# Patient Record
Sex: FEMALE | Race: WHITE | NOT HISPANIC OR LATINO | Employment: FULL TIME | URBAN - METROPOLITAN AREA
[De-identification: names, ages, dates, MRNs, and addresses within clinical notes are randomized per-mention and may not be internally consistent; named-entity substitution may affect disease eponyms.]

---

## 2017-04-24 PROBLEM — N92.0 HYPERMENORRHEA: Status: ACTIVE | Noted: 2017-04-24

## 2020-11-10 ENCOUNTER — HOSPITAL ENCOUNTER (OUTPATIENT)
Dept: RADIOLOGY | Facility: HOSPITAL | Age: 40
Discharge: HOME | End: 2020-11-10
Attending: INTERNAL MEDICINE
Payer: COMMERCIAL

## 2020-11-10 VITALS — WEIGHT: 156.94 LBS | BODY MASS INDEX: 26.79 KG/M2 | HEIGHT: 64 IN

## 2020-11-10 DIAGNOSIS — Z12.31 SCREENING MAMMOGRAM, ENCOUNTER FOR: ICD-10-CM

## 2020-11-10 PROCEDURE — 77063 MAMMO DIGITAL SCREENING BILAT WITH TOMO: ICD-10-PCS | Mod: 26,,, | Performed by: RADIOLOGY

## 2020-11-10 PROCEDURE — 77067 MAMMO DIGITAL SCREENING BILAT WITH TOMO: ICD-10-PCS | Mod: 26,,, | Performed by: RADIOLOGY

## 2020-11-10 PROCEDURE — 77067 SCR MAMMO BI INCL CAD: CPT | Mod: TC,PN

## 2020-11-10 PROCEDURE — 77067 SCR MAMMO BI INCL CAD: CPT | Mod: 26,,, | Performed by: RADIOLOGY

## 2020-11-10 PROCEDURE — 77063 BREAST TOMOSYNTHESIS BI: CPT | Mod: 26,,, | Performed by: RADIOLOGY

## 2021-07-22 RX ORDER — SPIRONOLACTONE 50 MG/1
50 TABLET, FILM COATED ORAL DAILY
Qty: 30 TABLET | Refills: 4 | Status: SHIPPED | OUTPATIENT
Start: 2021-07-22 | End: 2022-01-18

## 2021-11-16 ENCOUNTER — OFFICE VISIT (OUTPATIENT)
Dept: OBSTETRICS AND GYNECOLOGY | Facility: CLINIC | Age: 41
End: 2021-11-16
Payer: COMMERCIAL

## 2021-11-16 VITALS — BODY MASS INDEX: 26.98 KG/M2 | WEIGHT: 157.19 LBS

## 2021-11-16 DIAGNOSIS — Z01.419 ENCOUNTER FOR ANNUAL ROUTINE GYNECOLOGICAL EXAMINATION: Primary | ICD-10-CM

## 2021-11-16 PROCEDURE — 1160F PR REVIEW ALL MEDS BY PRESCRIBER/CLIN PHARMACIST DOCUMENTED: ICD-10-PCS | Mod: CPTII,S$GLB,, | Performed by: OBSTETRICS & GYNECOLOGY

## 2021-11-16 PROCEDURE — 88175 CYTOPATH C/V AUTO FLUID REDO: CPT | Performed by: OBSTETRICS & GYNECOLOGY

## 2021-11-16 PROCEDURE — 3008F BODY MASS INDEX DOCD: CPT | Mod: CPTII,S$GLB,, | Performed by: OBSTETRICS & GYNECOLOGY

## 2021-11-16 PROCEDURE — 1159F PR MEDICATION LIST DOCUMENTED IN MEDICAL RECORD: ICD-10-PCS | Mod: CPTII,S$GLB,, | Performed by: OBSTETRICS & GYNECOLOGY

## 2021-11-16 PROCEDURE — 99999 PR PBB SHADOW E&M-EST. PATIENT-LVL III: CPT | Mod: PBBFAC,,, | Performed by: OBSTETRICS & GYNECOLOGY

## 2021-11-16 PROCEDURE — 1160F RVW MEDS BY RX/DR IN RCRD: CPT | Mod: CPTII,S$GLB,, | Performed by: OBSTETRICS & GYNECOLOGY

## 2021-11-16 PROCEDURE — 99999 PR PBB SHADOW E&M-EST. PATIENT-LVL III: ICD-10-PCS | Mod: PBBFAC,,, | Performed by: OBSTETRICS & GYNECOLOGY

## 2021-11-16 PROCEDURE — 3008F PR BODY MASS INDEX (BMI) DOCUMENTED: ICD-10-PCS | Mod: CPTII,S$GLB,, | Performed by: OBSTETRICS & GYNECOLOGY

## 2021-11-16 PROCEDURE — 99396 PREV VISIT EST AGE 40-64: CPT | Mod: S$GLB,,, | Performed by: OBSTETRICS & GYNECOLOGY

## 2021-11-16 PROCEDURE — 99396 PR PREVENTIVE VISIT,EST,40-64: ICD-10-PCS | Mod: S$GLB,,, | Performed by: OBSTETRICS & GYNECOLOGY

## 2021-11-16 PROCEDURE — 1159F MED LIST DOCD IN RCRD: CPT | Mod: CPTII,S$GLB,, | Performed by: OBSTETRICS & GYNECOLOGY

## 2022-01-05 ENCOUNTER — PATIENT MESSAGE (OUTPATIENT)
Dept: ADMINISTRATIVE | Facility: OTHER | Age: 42
End: 2022-01-05
Payer: COMMERCIAL

## 2022-11-07 RX ORDER — SPIRONOLACTONE 50 MG/1
TABLET, FILM COATED ORAL
Qty: 30 TABLET | Refills: 0 | Status: SHIPPED | OUTPATIENT
Start: 2022-11-07 | End: 2023-01-19 | Stop reason: SDUPTHER

## 2022-12-12 RX ORDER — SPIRONOLACTONE 50 MG/1
TABLET, FILM COATED ORAL
Qty: 30 TABLET | Refills: 0 | OUTPATIENT
Start: 2022-12-12

## 2023-01-18 ENCOUNTER — OFFICE VISIT (OUTPATIENT)
Dept: OBSTETRICS AND GYNECOLOGY | Facility: CLINIC | Age: 43
End: 2023-01-18
Payer: COMMERCIAL

## 2023-01-18 VITALS
HEIGHT: 64 IN | SYSTOLIC BLOOD PRESSURE: 108 MMHG | DIASTOLIC BLOOD PRESSURE: 74 MMHG | BODY MASS INDEX: 28.79 KG/M2 | WEIGHT: 168.63 LBS

## 2023-01-18 DIAGNOSIS — Z01.419 ENCOUNTER FOR ANNUAL ROUTINE GYNECOLOGICAL EXAMINATION: ICD-10-CM

## 2023-01-18 DIAGNOSIS — Z12.4 SCREENING FOR MALIGNANT NEOPLASM OF CERVIX: Primary | ICD-10-CM

## 2023-01-18 PROCEDURE — 3008F PR BODY MASS INDEX (BMI) DOCUMENTED: ICD-10-PCS | Mod: CPTII,S$GLB,, | Performed by: OBSTETRICS & GYNECOLOGY

## 2023-01-18 PROCEDURE — 99999 PR PBB SHADOW E&M-EST. PATIENT-LVL IV: CPT | Mod: PBBFAC,,, | Performed by: OBSTETRICS & GYNECOLOGY

## 2023-01-18 PROCEDURE — 1160F PR REVIEW ALL MEDS BY PRESCRIBER/CLIN PHARMACIST DOCUMENTED: ICD-10-PCS | Mod: CPTII,S$GLB,, | Performed by: OBSTETRICS & GYNECOLOGY

## 2023-01-18 PROCEDURE — 99999 PR PBB SHADOW E&M-EST. PATIENT-LVL IV: ICD-10-PCS | Mod: PBBFAC,,, | Performed by: OBSTETRICS & GYNECOLOGY

## 2023-01-18 PROCEDURE — 99396 PR PREVENTIVE VISIT,EST,40-64: ICD-10-PCS | Mod: S$GLB,,, | Performed by: OBSTETRICS & GYNECOLOGY

## 2023-01-18 PROCEDURE — 99396 PREV VISIT EST AGE 40-64: CPT | Mod: S$GLB,,, | Performed by: OBSTETRICS & GYNECOLOGY

## 2023-01-18 PROCEDURE — 1159F PR MEDICATION LIST DOCUMENTED IN MEDICAL RECORD: ICD-10-PCS | Mod: CPTII,S$GLB,, | Performed by: OBSTETRICS & GYNECOLOGY

## 2023-01-18 PROCEDURE — 1160F RVW MEDS BY RX/DR IN RCRD: CPT | Mod: CPTII,S$GLB,, | Performed by: OBSTETRICS & GYNECOLOGY

## 2023-01-18 PROCEDURE — 3074F PR MOST RECENT SYSTOLIC BLOOD PRESSURE < 130 MM HG: ICD-10-PCS | Mod: CPTII,S$GLB,, | Performed by: OBSTETRICS & GYNECOLOGY

## 2023-01-18 PROCEDURE — 3078F DIAST BP <80 MM HG: CPT | Mod: CPTII,S$GLB,, | Performed by: OBSTETRICS & GYNECOLOGY

## 2023-01-18 PROCEDURE — 3008F BODY MASS INDEX DOCD: CPT | Mod: CPTII,S$GLB,, | Performed by: OBSTETRICS & GYNECOLOGY

## 2023-01-18 PROCEDURE — 3078F PR MOST RECENT DIASTOLIC BLOOD PRESSURE < 80 MM HG: ICD-10-PCS | Mod: CPTII,S$GLB,, | Performed by: OBSTETRICS & GYNECOLOGY

## 2023-01-18 PROCEDURE — 3074F SYST BP LT 130 MM HG: CPT | Mod: CPTII,S$GLB,, | Performed by: OBSTETRICS & GYNECOLOGY

## 2023-01-18 PROCEDURE — 88175 CYTOPATH C/V AUTO FLUID REDO: CPT | Performed by: OBSTETRICS & GYNECOLOGY

## 2023-01-18 PROCEDURE — 1159F MED LIST DOCD IN RCRD: CPT | Mod: CPTII,S$GLB,, | Performed by: OBSTETRICS & GYNECOLOGY

## 2023-01-18 RX ORDER — LEVOTHYROXINE SODIUM 88 UG/1
88 TABLET ORAL
COMMUNITY
Start: 2022-11-02

## 2023-01-18 NOTE — PROGRESS NOTES
Chief Complaint   Patient presents with    Well Woman       History and Physical:  No LMP recorded. Patient has had an ablation.       Tash Kwon is a 42 y.o.  WF who presents today for her routine annual GYN exam. The patient has no Gynecology complaints today. No menses      Allergies: Review of patient's allergies indicates:  No Known Allergies    Past Medical History:   Diagnosis Date    Acne     Anxiety     Breast disorder     H/O MTHFR mutation     Homozygous for MTHFR gene mutation     Hyperthyroidism     Pseudoangiomatous stromal hyperplasia of breast     Thyroid disease     Wears contact lenses        Past Surgical History:   Procedure Laterality Date    AUGMENTATION OF BREAST  2013    BREAST BIOPSY Left     BREAST SURGERY Bilateral     augmentation with implants    ENDOMETRIAL ABLATION      uterine ablation  2017       MEDS:   Current Outpatient Medications on File Prior to Visit   Medication Sig Dispense Refill    ASHWAGANDHA ROOT EXTRACT ORAL Take by mouth.      b complex vitamins capsule Take 1 capsule by mouth once daily.      DIINDOLYLMETHANE, BULK, MISC by Misc.(Non-Drug; Combo Route) route.       spironolactone (ALDACTONE) 50 MG tablet TAKE 1 TABLET(50 MG) BY MOUTH EVERY DAY 30 tablet 0    tretinoin (RETIN-A) 0.05 % cream Apply AS DIRECTED  0    VITAMED MD ONE RX 30 mg iron-1mg -200 mg Cap Take 1 capsule by mouth once daily.  6    vitamin D (VITAMIN D3) 1000 units Tab Take 1,000 Units by mouth once daily.      CMPD testosterone proprionate 2% in vanicream Apply 11 mg topically once daily. Apply 1 gram as directed 2-3 times weekly.      CYTOMEL 5 mcg Tab Take 5 mcg by mouth every morning.      diazePAM (VALIUM) 5 MG tablet Take 1 tablet (5 mg total) by mouth On call Procedure for Anxiety (30 minutes prior to MRI, may repeat x1). 2 tablet 0    estradioL (ESTRACE) 2 MG tablet TAKE 1 TABLET BY MOUTH EVERY NIGHT AT BEDTIME WITH CYCLE EVERY MONTH      LIDOcaine (LIDODERM) 5 % Place 1  patch onto the skin once daily. Remove & Discard patch within 12 hours or as directed by MD (Patient not taking: Reported on 2022) 12 patch 0    SYNTHROID 125 mcg tablet TAKE 1 TABLET BY MOUTH ONCE DAILY (Patient taking differently: 62.5 mcg. Take 1/2 dose) 90 tablet 3    SYNTHROID 88 mcg tablet Take 88 mcg by mouth.      TESTOSTERONE, BULK, MISC Inject as directed.       No current facility-administered medications on file prior to visit.       OB History          3    Para   3    Term   3            AB        Living   3         SAB        IAB        Ectopic        Multiple        Live Births                     Social History     Socioeconomic History    Marital status:    Tobacco Use    Smoking status: Never    Smokeless tobacco: Never   Substance and Sexual Activity    Alcohol use: Yes     Comment: occasional    Drug use: No    Sexual activity: Yes       Family History   Problem Relation Age of Onset    Brain cancer Paternal Grandfather     Breast cancer Paternal Grandmother 40        x2, 60 second dx    Cancer Paternal Grandmother         breast    No Known Problems Father     Rheum arthritis Mother     Other Mother         MTHFR    Breast cancer Maternal Aunt 60    Liver cancer Maternal Aunt     Cancer Maternal Aunt         breast    Leukemia Maternal Uncle     Leukemia Maternal Uncle     Ovarian cancer Neg Hx          Past medical and surgical history reviewed.   I have reviewed the patient's medical history in detail and updated the computerized patient record.        Review of System:   General: no chills, fever, night sweats, weight gain or weight loss  Psychological: no depression or suicidal ideation  Breasts: no new or changing breast lumps, nipple discharge or masses.  Respiratory: no cough, shortness of breath, or wheezing  Cardiovascular: no chest pain or dyspnea on exertion  Gastrointestinal: no abdominal pain, change in bowel habits, or black or bloody  "stools  Genito-Urinary: no incontinence, urinary frequency/urgency or vulvar/vaginal symptoms, pelvic pain or abnormal vaginal bleeding.  Musculoskeletal: no gait disturbance or muscular weakness      Physical Exam:   /74   Ht 5' 4" (1.626 m)   Wt 76.5 kg (168 lb 10.4 oz)   BMI 28.95 kg/m²   Constitutional: She appears alert and responsive. She appears well-developed, well-groomed, and well-nourished. No distress.   HENT:   Head: Normocephalic and atraumatic.   Eyes: Conjunctivae and EOM are normal. No scleral icterus.   Neck: Symmetrical. Normal range of motion. Neck supple. No tracheal deviation present. THYROID:  without masses or tenderness.  Cardiovascular: Normal rate, no rhythm abnormality noted. Extremities without swelling or edema, warm.    Pulmonary/Chest: Normal respiratory Effort. No distress or retractions. She exhibits no tenderness.  Breasts: are symmetrical.no masses, bilat aug   Right breast exhibits no inverted nipple, no mass, no nipple discharge, no skin change and no tenderness.   Left breast exhibits no inverted nipple, no mass, no nipple discharge, no skin change and no tenderness.  Abdominal: Soft. She exhibits no distension, hernias or masses. There is no tenderness. No enlargement of liver edge or spleen.  There is no rebound and no guarding.   Genitourinary:    External rectal exam shows no thrombosed external hemorrhoids, no lesions.     Pelvic exam was performed with patient supine.   No labial fusion, and symmetrical.    There is no rash, lesion or injury on the right labia.   There is no rash, lesion or injury on the left labia.   No bleeding and no signs of injury around the vaginal introitus, urethral meatus is normal size and without prolapse or lesions, urethra well supported. The cervix is visualized with no discharge, lesions or friability.   No vaginal discharge found.    No significant Cystocele, Enterocele or rectocele, and cervix and uterus well supported.   " Bimanual exam:   The urethra is normal to palpation and there are no palpable vaginal wall masses.   Uterus is not deviated, not enlarged, not fixed, normal shape and not tender.   Cervix exhibits no motion tenderness.    Right adnexum displays no mass or nodularity and no tenderness.   Left adnexum displays no mass or nodularity and no tenderness.  Musculoskeletal: Normal range of motion.   Lymphadenopathy: No inguinal adenopathy present.   Neurological: She is alert and oriented to person, place, and time. Coordination normal.   Skin: Skin is warm and dry. She is not diaphoretic. No rashes, lesions or ulcers.   Psychiatric: She has a normal mood and affect, oriented to person, place, and time.      Assessment:   Normal annual GYN exam  1. Screening for malignant neoplasm of cervix  Liquid-Based Pap Smear, Screening      2. Encounter for annual routine gynecological examination          Prev ablation    Plan:   PAP  Mammogram done  Follow up in 1 year.  Patient informed will be contacted with results within 2 weeks. Encouraged to please call back or email if she has not heard from us by then.

## 2023-12-07 DIAGNOSIS — Z91.89 AT HIGH RISK FOR BREAST CANCER: Primary | ICD-10-CM

## 2024-01-16 ENCOUNTER — OFFICE VISIT (OUTPATIENT)
Dept: OBSTETRICS AND GYNECOLOGY | Facility: CLINIC | Age: 44
End: 2024-01-16
Payer: COMMERCIAL

## 2024-01-16 VITALS
HEIGHT: 64 IN | SYSTOLIC BLOOD PRESSURE: 112 MMHG | WEIGHT: 169.31 LBS | DIASTOLIC BLOOD PRESSURE: 66 MMHG | BODY MASS INDEX: 28.91 KG/M2

## 2024-01-16 DIAGNOSIS — Z01.419 ENCOUNTER FOR ANNUAL ROUTINE GYNECOLOGICAL EXAMINATION: Primary | ICD-10-CM

## 2024-01-16 DIAGNOSIS — Z98.890 HISTORY OF ENDOMETRIAL ABLATION: ICD-10-CM

## 2024-01-16 PROCEDURE — 99396 PREV VISIT EST AGE 40-64: CPT | Mod: S$GLB,,, | Performed by: OBSTETRICS & GYNECOLOGY

## 2024-01-16 PROCEDURE — 88175 CYTOPATH C/V AUTO FLUID REDO: CPT | Performed by: OBSTETRICS & GYNECOLOGY

## 2024-01-16 PROCEDURE — 99999 PR PBB SHADOW E&M-EST. PATIENT-LVL IV: CPT | Mod: PBBFAC,,, | Performed by: OBSTETRICS & GYNECOLOGY

## 2024-01-16 RX ORDER — SPIRONOLACTONE 50 MG/1
50 TABLET, FILM COATED ORAL DAILY
Qty: 30 TABLET | Refills: 11 | Status: SHIPPED | OUTPATIENT
Start: 2024-01-16 | End: 2024-01-16

## 2024-01-16 RX ORDER — SPIRONOLACTONE 50 MG/1
50 TABLET, FILM COATED ORAL DAILY
Qty: 30 TABLET | Refills: 11 | Status: SHIPPED | OUTPATIENT
Start: 2024-01-16 | End: 2025-01-15

## 2024-01-16 NOTE — PROGRESS NOTES
Chief Complaint   Patient presents with    Well Woman       History and Physical:  No LMP recorded. Patient has had an ablation.       Tash Kwon is a 43 y.o.  WF who presents today for her routine annual GYN exam. The patient has no Gynecology complaints today. No mense, had ablation      Allergies: Review of patient's allergies indicates:  No Known Allergies    Past Medical History:   Diagnosis Date    Acne     Anxiety     Breast disorder     H/O MTHFR mutation     Homozygous for MTHFR gene mutation     Hyperthyroidism     Pseudoangiomatous stromal hyperplasia of breast     Thyroid disease     Wears contact lenses        Past Surgical History:   Procedure Laterality Date    AUGMENTATION OF BREAST  2013    BREAST BIOPSY Left     BREAST SURGERY Bilateral     augmentation with implants    ENDOMETRIAL ABLATION      uterine ablation  2017       MEDS:   Current Outpatient Medications on File Prior to Visit   Medication Sig Dispense Refill    ASHWAGANDHA ROOT EXTRACT ORAL Take by mouth.      b complex vitamins capsule Take 1 capsule by mouth once daily.      CMPD testosterone proprionate 2% in vanicream Apply 11 mg topically once daily. Apply 1 gram as directed 2-3 times weekly.      [START ON 2024] diazePAM (VALIUM) 5 MG tablet Take 1 tablet (5 mg total) by mouth On call Procedure for Anxiety (30 minutes prior to MRI, may repeat x1). 2 tablet 0    DIINDOLYLMETHANE, BULK, MISC by Misc.(Non-Drug; Combo Route) route.       metroNIDAZOLE (METROGEL) 0.75 % (37.5mg/5 gram) vaginal gel Place 1 applicator vaginally every evening. for 5 doses 14 applicator 0    SYNTHROID 125 mcg tablet TAKE 1 TABLET BY MOUTH ONCE DAILY (Patient taking differently: 62.5 mcg. Take 1/2 dose) 90 tablet 3    SYNTHROID 88 mcg tablet Take 88 mcg by mouth.      tretinoin (RETIN-A) 0.05 % cream Apply AS DIRECTED  0    BASIL JHA ONE RX 30 mg iron-1mg -200 mg Cap Take 1 capsule by mouth once daily.  6    vitamin D (VITAMIN D3) 1000  units Tab Take 1,000 Units by mouth once daily.      [DISCONTINUED] spironolactone (ALDACTONE) 50 MG tablet Take 1 tablet (50 mg total) by mouth once daily. 30 tablet 11     No current facility-administered medications on file prior to visit.       OB History          3    Para   3    Term   3            AB        Living   3         SAB        IAB        Ectopic        Multiple        Live Births                     Social History     Socioeconomic History    Marital status:    Tobacco Use    Smoking status: Never    Smokeless tobacco: Never   Substance and Sexual Activity    Alcohol use: Yes     Comment: occasional    Drug use: No    Sexual activity: Yes       Family History   Problem Relation Age of Onset    Brain cancer Paternal Grandfather     Breast cancer Paternal Grandmother 40        x2, 60 second dx    Cancer Paternal Grandmother         breast    No Known Problems Father     Rheum arthritis Mother     Other Mother         MTHFR    Breast cancer Maternal Aunt 60    Liver cancer Maternal Aunt     Cancer Maternal Aunt         breast    Leukemia Maternal Uncle     Leukemia Maternal Uncle     Ovarian cancer Neg Hx          Past medical and surgical history reviewed.   I have reviewed the patient's medical history in detail and updated the computerized patient record.        Review of System:   General: no chills, fever, night sweats, weight gain or weight loss  Psychological: no depression or suicidal ideation  Breasts: no new or changing breast lumps, nipple discharge or masses.  Respiratory: no cough, shortness of breath, or wheezing  Cardiovascular: no chest pain or dyspnea on exertion  Gastrointestinal: no abdominal pain, change in bowel habits, or black or bloody stools  Genito-Urinary: no incontinence, urinary frequency/urgency or vulvar/vaginal symptoms, pelvic pain or abnormal vaginal bleeding.  Musculoskeletal: no gait disturbance or muscular weakness      Physical Exam:   BP  "112/66   Ht 5' 4" (1.626 m)   Wt 76.8 kg (169 lb 5 oz)   BMI 29.06 kg/m²   Constitutional: She appears alert and responsive. She appears well-developed, well-groomed, and well-nourished. No distress.   HENT:   Head: Normocephalic and atraumatic.   Eyes: Conjunctivae and EOM are normal. No scleral icterus.   Neck: Symmetrical. Normal range of motion. Neck supple. No tracheal deviation present. THYROID:  without masses or tenderness.  Cardiovascular: Normal rate, no rhythm abnormality noted. Extremities without swelling or edema, warm.    Pulmonary/Chest: Normal respiratory Effort. No distress or retractions. She exhibits no tenderness.  Breasts: are symmetrical.no masses, bilat aug   Right breast exhibits no inverted nipple, no mass, no nipple discharge, no skin change and no tenderness.   Left breast exhibits no inverted nipple, no mass, no nipple discharge, no skin change and no tenderness.  Abdominal: Soft. She exhibits no distension, hernias or masses. There is no tenderness. No enlargement of liver edge or spleen.  There is no rebound and no guarding.   Genitourinary:    External rectal exam shows no thrombosed external hemorrhoids, no lesions.     Pelvic exam was performed with patient supine.   No labial fusion, and symmetrical.    There is no rash, lesion or injury on the right labia.   There is no rash, lesion or injury on the left labia.   No bleeding and no signs of injury around the vaginal introitus, urethral meatus is normal size and without prolapse or lesions, urethra well supported. The cervix is visualized with no discharge, lesions or friability.   No vaginal discharge found.    No significant Cystocele, Enterocele or rectocele, and cervix and uterus well supported.   Bimanual exam:   The urethra is normal to palpation and there are no palpable vaginal wall masses.   Uterus is not deviated, not enlarged, not fixed, normal shape and not tender.   Cervix exhibits no motion tenderness.    Right " adnexum displays no mass or nodularity and no tenderness.   Left adnexum displays no mass or nodularity and no tenderness.  Musculoskeletal: Normal range of motion.   Lymphadenopathy: No inguinal adenopathy present.   Neurological: She is alert and oriented to person, place, and time. Coordination normal.   Skin: Skin is warm and dry. She is not diaphoretic. No rashes, lesions or ulcers.   Psychiatric: She has a normal mood and affect, oriented to person, place, and time.      Assessment:   Normal annual GYN exam  1. Encounter for annual routine gynecological examination  Liquid-Based Pap Smear, Screening      2. History of endometrial ablation              Plan:   PAP  Mammogram done  Follow up in 1 year.  Patient informed will be contacted with results within 2 weeks. Encouraged to please call back or email if she has not heard from us by then.

## 2024-01-22 LAB
CLINICAL INFO: NORMAL
CYTO CVX: NORMAL
CYTOLOGIST CVX/VAG CYTO: NORMAL
CYTOLOGIST CVX/VAG CYTO: NORMAL
CYTOLOGY CMNT CVX/VAG CYTO-IMP: NORMAL
CYTOLOGY PAP THIN PREP EXPLANATION: NORMAL
DATE OF PREVIOUS PAP: NO
DATE PREVIOUS BX: NO
GEN CATEG CVX/VAG CYTO-IMP: NORMAL
LMP START DATE: NORMAL
MICROORGANISM CVX/VAG CYTO: NORMAL
PATHOLOGIST CVX/VAG CYTO: NORMAL
SERVICE CMNT-IMP: NORMAL
SPECIMEN SOURCE CVX/VAG CYTO: NORMAL
STAT OF ADQ CVX/VAG CYTO-IMP: NORMAL

## 2024-02-27 ENCOUNTER — OFFICE VISIT (OUTPATIENT)
Dept: OBSTETRICS AND GYNECOLOGY | Facility: CLINIC | Age: 44
End: 2024-02-27
Payer: COMMERCIAL

## 2024-02-27 VITALS
BODY MASS INDEX: 28.12 KG/M2 | DIASTOLIC BLOOD PRESSURE: 62 MMHG | SYSTOLIC BLOOD PRESSURE: 114 MMHG | WEIGHT: 163.81 LBS

## 2024-02-27 DIAGNOSIS — R87.615 UNSATISFACTORY CERVICAL PAPANICOLAOU SMEAR: Primary | ICD-10-CM

## 2024-02-27 PROCEDURE — 99499 UNLISTED E&M SERVICE: CPT | Mod: S$GLB,,, | Performed by: OBSTETRICS & GYNECOLOGY

## 2024-02-27 PROCEDURE — 88175 CYTOPATH C/V AUTO FLUID REDO: CPT | Performed by: OBSTETRICS & GYNECOLOGY

## 2024-02-27 PROCEDURE — 99999 PR PBB SHADOW E&M-EST. PATIENT-LVL III: CPT | Mod: PBBFAC,,, | Performed by: OBSTETRICS & GYNECOLOGY

## 2024-02-27 NOTE — PROGRESS NOTES
"Chief Complaint   Patient presents with    Gynecologic Exam     Repeat pap       History of Present Illness   43 y.o.    patient presents today for need pap, was "unsatisf."    Past medical and surgical history reviewed.   I have reviewed the patient's medical history in detail and updated the computerized patient record.    Review of patient's allergies indicates:  No Known Allergies            Physical Examination:  /62   Wt 74.3 kg (163 lb 12.8 oz)   LMP  (LMP Unknown)   BMI 28.12 kg/m²    Cx: no lesions Pap      Assessment:    1. Unsatisfactory cervical Papanicolaou smear  Liquid-Based Pap Smear, Screening          Plan:  Pap  Annual if normal  Patient informed will be contacted with results within 2 weeks. Encouraged to please call back or email if she has not heard from us by then.          "

## 2024-03-01 LAB
FINAL PATHOLOGIC DIAGNOSIS: NORMAL
Lab: NORMAL

## 2025-01-26 RX ORDER — SPIRONOLACTONE 50 MG/1
50 TABLET, FILM COATED ORAL
Qty: 90 TABLET | Refills: 0 | Status: SHIPPED | OUTPATIENT
Start: 2025-01-26

## 2025-01-26 NOTE — TELEPHONE ENCOUNTER
Refill Decision Note   Tash Kwon  is requesting a refill authorization.  Brief Assessment and Rationale for Refill:  Approve     Medication Therapy Plan:       Medication Reconciliation Completed: No   Comments:     No Care Gaps recommended.     Note composed:12:16 PM 01/26/2025

## 2025-03-24 ENCOUNTER — OFFICE VISIT (OUTPATIENT)
Dept: OBSTETRICS AND GYNECOLOGY | Facility: CLINIC | Age: 45
End: 2025-03-24
Payer: COMMERCIAL

## 2025-03-24 VITALS
WEIGHT: 168.19 LBS | DIASTOLIC BLOOD PRESSURE: 76 MMHG | SYSTOLIC BLOOD PRESSURE: 122 MMHG | BODY MASS INDEX: 28.87 KG/M2

## 2025-03-24 DIAGNOSIS — Z01.419 ENCOUNTER FOR ANNUAL ROUTINE GYNECOLOGICAL EXAMINATION: Primary | ICD-10-CM

## 2025-03-24 DIAGNOSIS — N99.85 POST ENDOMETRIAL ABLATION SYNDROME: ICD-10-CM

## 2025-03-24 DIAGNOSIS — N94.6 MENSTRUAL CRAMPS: ICD-10-CM

## 2025-03-24 DIAGNOSIS — Z98.890 HISTORY OF ENDOMETRIAL ABLATION: ICD-10-CM

## 2025-03-24 DIAGNOSIS — R39.9 UTI SYMPTOMS: ICD-10-CM

## 2025-03-24 LAB
BILIRUBIN, UA POC OHS: NEGATIVE
BLOOD, UA POC OHS: ABNORMAL
CLARITY, UA POC OHS: CLEAR
COLOR, UA POC OHS: YELLOW
GLUCOSE, UA POC OHS: NEGATIVE
KETONES, UA POC OHS: NEGATIVE
LEUKOCYTES, UA POC OHS: ABNORMAL
NITRITE, UA POC OHS: NEGATIVE
PH, UA POC OHS: 6.5
PROTEIN, UA POC OHS: NEGATIVE
SPECIFIC GRAVITY, UA POC OHS: 1.01
UROBILINOGEN, UA POC OHS: 0.2

## 2025-03-24 PROCEDURE — 87086 URINE CULTURE/COLONY COUNT: CPT | Performed by: OBSTETRICS & GYNECOLOGY

## 2025-03-24 PROCEDURE — 99396 PREV VISIT EST AGE 40-64: CPT | Mod: S$GLB,,, | Performed by: OBSTETRICS & GYNECOLOGY

## 2025-03-24 PROCEDURE — 3008F BODY MASS INDEX DOCD: CPT | Mod: CPTII,S$GLB,, | Performed by: OBSTETRICS & GYNECOLOGY

## 2025-03-24 PROCEDURE — 1159F MED LIST DOCD IN RCRD: CPT | Mod: CPTII,S$GLB,, | Performed by: OBSTETRICS & GYNECOLOGY

## 2025-03-24 PROCEDURE — 1160F RVW MEDS BY RX/DR IN RCRD: CPT | Mod: CPTII,S$GLB,, | Performed by: OBSTETRICS & GYNECOLOGY

## 2025-03-24 PROCEDURE — 3074F SYST BP LT 130 MM HG: CPT | Mod: CPTII,S$GLB,, | Performed by: OBSTETRICS & GYNECOLOGY

## 2025-03-24 PROCEDURE — 3078F DIAST BP <80 MM HG: CPT | Mod: CPTII,S$GLB,, | Performed by: OBSTETRICS & GYNECOLOGY

## 2025-03-24 PROCEDURE — 88175 CYTOPATH C/V AUTO FLUID REDO: CPT | Mod: TC | Performed by: OBSTETRICS & GYNECOLOGY

## 2025-03-24 PROCEDURE — 99999 PR PBB SHADOW E&M-EST. PATIENT-LVL IV: CPT | Mod: PBBFAC,,, | Performed by: OBSTETRICS & GYNECOLOGY

## 2025-03-24 PROCEDURE — 81003 URINALYSIS AUTO W/O SCOPE: CPT | Mod: QW,S$GLB,, | Performed by: OBSTETRICS & GYNECOLOGY

## 2025-03-24 RX ORDER — NYSTATIN AND TRIAMCINOLONE ACETONIDE 100000; 1 [USP'U]/G; MG/G
CREAM TOPICAL
Qty: 30 G | Refills: 1 | Status: SHIPPED | OUTPATIENT
Start: 2025-03-24 | End: 2026-03-24

## 2025-03-24 RX ORDER — PROGESTERONE 100 MG/1
100 CAPSULE ORAL NIGHTLY
COMMUNITY
Start: 2025-02-21

## 2025-03-24 RX ORDER — TERBINAFINE HYDROCHLORIDE 250 MG/1
250 TABLET ORAL
COMMUNITY
Start: 2025-03-17

## 2025-03-24 RX ORDER — FLUOXETINE HYDROCHLORIDE 20 MG/1
20 CAPSULE ORAL DAILY
Qty: 30 CAPSULE | Refills: 2 | Status: SHIPPED | OUTPATIENT
Start: 2025-03-24 | End: 2026-03-24

## 2025-03-24 NOTE — PROGRESS NOTES
"Chief Complaint   Patient presents with    Annual Exam    Cramping     Patient states that she is experiencing " period like cramps " for 1 -2 months.        History and Physical:  No LMP recorded. Patient has had an ablation.       Tash Kwon is a 44 y.o.  WF who presents today for her routine annual GYN exam. The patient has no Gynecology complaints today. Crampy pain, anxiety      Allergies: Review of patient's allergies indicates:  No Known Allergies    Past Medical History:   Diagnosis Date    Acne     Anxiety     Breast disorder     H/O MTHFR mutation     Homozygous for MTHFR gene mutation     Hyperthyroidism     Pseudoangiomatous stromal hyperplasia of breast     Thyroid disease     Wears contact lenses        Past Surgical History:   Procedure Laterality Date    AUGMENTATION OF BREAST  2013    BREAST BIOPSY Left     (PAS)    BREAST SURGERY Bilateral     augmentation with implants    ENDOMETRIAL ABLATION      uterine ablation  2017       MEDS: Medications Ordered Prior to Encounter[1]    OB History          3    Para   3    Term   3            AB        Living   3         SAB        IAB        Ectopic        Multiple        Live Births                     Social History[2]    Family History   Problem Relation Name Age of Onset    Brain cancer Paternal Grandfather      Breast cancer Paternal Grandmother  40        x2, 60 second dx    Cancer Paternal Grandmother          breast    No Known Problems Father      Rheum arthritis Mother      Breast cancer Maternal Aunt  60    Liver cancer Maternal Aunt      Cancer Maternal Aunt          breast    Leukemia Maternal Uncle      Leukemia Maternal Uncle      Ovarian cancer Neg Hx      Stroke Neg Hx      Uterine cancer Neg Hx      Colon cancer Neg Hx           Past medical and surgical history reviewed.   I have reviewed the patient's medical history in detail and updated the computerized patient record.        Review of System: "   General: no chills, fever, night sweats, weight gain or weight loss  Psychological: no depression or suicidal ideation  Breasts: no new or changing breast lumps, nipple discharge or masses.  Respiratory: no cough, shortness of breath, or wheezing  Cardiovascular: no chest pain or dyspnea on exertion  Gastrointestinal: no abdominal pain, change in bowel habits, or black or bloody stools  Genito-Urinary: no incontinence, urinary frequency/urgency or vulvar/vaginal symptoms, pelvic pain or abnormal vaginal bleeding.  Musculoskeletal: no gait disturbance or muscular weakness      Physical Exam:   /76 (Patient Position: Sitting)   Wt 76.3 kg (168 lb 3.4 oz)   BMI 28.87 kg/m²   Constitutional: She appears alert and responsive. She appears well-developed, well-groomed, and well-nourished. No distress.   HENT:   Head: Normocephalic and atraumatic.   Eyes: Conjunctivae and EOM are normal. No scleral icterus.   Neck: Symmetrical. Normal range of motion. Neck supple. No tracheal deviation present. THYROID:  without masses or tenderness.  Cardiovascular: Normal rate, no rhythm abnormality noted. Extremities without swelling or edema, warm.    Pulmonary/Chest: Normal respiratory Effort. No distress or retractions. She exhibits no tenderness.  Breasts: are symmetrical.bilat implants   Right breast exhibits no inverted nipple, no mass, no nipple discharge, no skin change and no tenderness.   Left breast exhibits no inverted nipple, no mass, no nipple discharge, no skin change and no tenderness.  Abdominal: Soft. She exhibits no distension, hernias or masses. There is no tenderness. No enlargement of liver edge or spleen.  There is no rebound and no guarding.   Genitourinary:    External rectal exam shows no thrombosed external hemorrhoids, no lesions.     Pelvic exam was performed with patient supine.   No labial fusion, and symmetrical.    There is no rash, lesion or injury on the right labia.   There is no rash, lesion  or injury on the left labia.   No bleeding and no signs of injury around the vaginal introitus, urethral meatus is normal size and without prolapse or lesions, urethra well supported. The cervix is visualized with no discharge, lesions or friability.   No vaginal discharge found.    No significant Cystocele, Enterocele or rectocele, and cervix and uterus well supported.   Bimanual exam:   The urethra is normal to palpation and there are no palpable vaginal wall masses.   Uterus is not deviated, not enlarged, not fixed, normal shape and not tender.   Cervix exhibits no motion tenderness.    Right adnexum displays no mass or nodularity and no tenderness.   Left adnexum displays no mass or nodularity and no tenderness.  Musculoskeletal: Normal range of motion.   Lymphadenopathy: No inguinal adenopathy present.   Neurological: She is alert and oriented to person, place, and time. Coordination normal.   Skin: Skin is warm and dry. She is not diaphoretic. No rashes, lesions or ulcers.   Psychiatric: She has a normal mood and affect, oriented to person, place, and time.      Assessment:   Normal annual GYN exam  1. Encounter for annual routine gynecological examination        2. History of endometrial ablation        3. Menstrual cramps  Liquid-Based Pap Smear, Screening    POCT Urinalysis(Instrument)    US Pelvis Comp with Transvag NON-OB (xpd      4. Post endometrial ablation syndrome  Liquid-Based Pap Smear, Screening    POCT Urinalysis(Instrument)    US Pelvis Comp with Transvag NON-OB (xpd        anxiety    Plan:   PAP  Mammogram done  Trial Prozac 20 mg  Mycolog  GI eval hypochol.  Follow up in 2 weeks sono and ov.  Patient informed will be contacted with results within 2 weeks. Encouraged to please call back or email if she has not heard from us by then.         [1]   Current Outpatient Medications on File Prior to Visit   Medication Sig Dispense Refill    DIINDOLYLMETHANE, BULK, MISC by Misc.(Non-Drug; Combo Route)  route.       progesterone (PROMETRIUM) 100 MG capsule Take 100 mg by mouth every evening.      propranoloL (INDERAL) 10 MG tablet Take 10 mg by mouth.      spironolactone (ALDACTONE) 50 MG tablet TAKE 1 TABLET BY MOUTH EVERY DAY 90 tablet 0    SYNTHROID 88 mcg tablet Take 88 mcg by mouth.      terbinafine HCL (LAMISIL) 250 mg tablet Take 250 mg by mouth.      vitamin D (VITAMIN D3) 1000 units Tab Take 1,000 Units by mouth once daily.      vitamin E 1000 UNIT capsule Take 1,000 Units by mouth once daily.      [START ON 6/23/2025] diazePAM (VALIUM) 5 MG tablet Take 1 tablet (5 mg total) by mouth once. for 1 dose (Patient not taking: Reported on 3/24/2025) 2 tablet 0     No current facility-administered medications on file prior to visit.   [2]   Social History  Socioeconomic History    Marital status:    Tobacco Use    Smoking status: Never    Smokeless tobacco: Never   Substance and Sexual Activity    Alcohol use: Yes     Comment: occasional    Drug use: No    Sexual activity: Yes     Partners: Male

## 2025-03-26 ENCOUNTER — RESULTS FOLLOW-UP (OUTPATIENT)
Dept: OBSTETRICS AND GYNECOLOGY | Facility: CLINIC | Age: 45
End: 2025-03-26

## 2025-03-26 LAB — BACTERIA UR CULT: NO GROWTH

## 2025-03-29 LAB
INSULIN SERPL-ACNC: NORMAL U[IU]/ML
LAB AP BETHESDA CATEGORY: NORMAL
LAB AP CLINICAL FINDINGS: NORMAL
LAB AP CONTRACEPTIVES: NORMAL
LAB AP GYN ADDITIONAL FINDINGS: NORMAL
LAB AP LMP DATE: NORMAL
LAB AP OCHS PAP SPECIMEN ADEQUACY: NORMAL
LAB AP OHS PAP INTERPRETATION: NORMAL
LAB AP PAP DISCLAIMER COMMENTS: NORMAL
LAB AP PAP ESTROGEN REPLACEMENT THERAPY: NORMAL
LAB AP PAP PMP: NORMAL
LAB AP PAP PREVIOUS BX: NORMAL
LAB AP PAP PRIOR TREATMENT: NORMAL
LAB AP PERFORMING LOCATION(S): NORMAL

## 2025-04-08 ENCOUNTER — OFFICE VISIT (OUTPATIENT)
Dept: OBSTETRICS AND GYNECOLOGY | Facility: CLINIC | Age: 45
End: 2025-04-08
Payer: COMMERCIAL

## 2025-04-08 ENCOUNTER — HOSPITAL ENCOUNTER (OUTPATIENT)
Dept: RADIOLOGY | Facility: HOSPITAL | Age: 45
Discharge: HOME | End: 2025-04-08
Attending: OBSTETRICS & GYNECOLOGY
Payer: COMMERCIAL

## 2025-04-08 VITALS — DIASTOLIC BLOOD PRESSURE: 70 MMHG | SYSTOLIC BLOOD PRESSURE: 110 MMHG

## 2025-04-08 DIAGNOSIS — N94.6 MENSTRUAL CRAMPS: ICD-10-CM

## 2025-04-08 DIAGNOSIS — Z98.890 HISTORY OF ENDOMETRIAL ABLATION: Primary | ICD-10-CM

## 2025-04-08 DIAGNOSIS — N99.85 POST ENDOMETRIAL ABLATION SYNDROME: ICD-10-CM

## 2025-04-08 PROCEDURE — 76856 US EXAM PELVIC COMPLETE: CPT | Mod: 26,,, | Performed by: RADIOLOGY

## 2025-04-08 PROCEDURE — 99999 PR PBB SHADOW E&M-EST. PATIENT-LVL III: CPT | Mod: PBBFAC,,, | Performed by: OBSTETRICS & GYNECOLOGY

## 2025-04-08 PROCEDURE — 76830 TRANSVAGINAL US NON-OB: CPT | Mod: 26,,, | Performed by: RADIOLOGY

## 2025-04-08 PROCEDURE — 76830 TRANSVAGINAL US NON-OB: CPT | Mod: TC,PN

## 2025-04-08 NOTE — PROGRESS NOTES
Chief Complaint   Patient presents with    Follow-up     Follow up to gyn sono        History of Present Illness   44 y.o. WF   patient presents today for cramps post ablation, no bleeeding    Past medical and surgical history reviewed.   I have reviewed the patient's medical history in detail and updated the computerized patient record.    Review of patient's allergies indicates:  No Known Allergies      Review of Systems -   GEN ROS: negative  Breast ROS: negative  Genito-Urinary ROS: crasmps      Physical Examination:  /70 (BP Location: Right arm, Patient Position: Sitting)    Sono: endom thin  Possible fluid within endom      Assessment:    1. History of endometrial ablation        2. Menstrual cramps        3. Post endometrial ablation syndrome            Plan:  Trial prozac  Increase prom to 200 mg Qhs  Hole estraogen for now  Patient informed will be contacted with results within 2 weeks. Encouraged to please call back or email if she has not heard from us by then.

## 2025-04-24 RX ORDER — SPIRONOLACTONE 50 MG/1
50 TABLET, FILM COATED ORAL
Qty: 90 TABLET | Refills: 3 | Status: SHIPPED | OUTPATIENT
Start: 2025-04-24

## 2025-04-24 NOTE — TELEPHONE ENCOUNTER
Refill Decision Note   Tash Kwon  is requesting a refill authorization.  Brief Assessment and Rationale for Refill:  Approve     Medication Therapy Plan:         Comments:     Note composed:5:29 AM 04/24/2025